# Patient Record
Sex: FEMALE | Race: WHITE | HISPANIC OR LATINO | URBAN - NONMETROPOLITAN AREA
[De-identification: names, ages, dates, MRNs, and addresses within clinical notes are randomized per-mention and may not be internally consistent; named-entity substitution may affect disease eponyms.]

---

## 2022-01-24 ENCOUNTER — OFFICE VISIT (OUTPATIENT)
Dept: URBAN - NONMETROPOLITAN AREA CLINIC 6 | Facility: CLINIC | Age: 62
End: 2022-01-24
Payer: COMMERCIAL

## 2022-01-24 DIAGNOSIS — H25.13 AGE-RELATED NUCLEAR CATARACT, BILATERAL: ICD-10-CM

## 2022-01-24 DIAGNOSIS — H04.123 TEAR FILM INSUFFICIENCY OF BILATERAL LACRIMAL GLANDS: ICD-10-CM

## 2022-01-24 DIAGNOSIS — H43.393 OTHER VITREOUS OPACITIES, BILATERAL: Primary | ICD-10-CM

## 2022-01-24 PROCEDURE — 99204 OFFICE O/P NEW MOD 45 MIN: CPT | Performed by: STUDENT IN AN ORGANIZED HEALTH CARE EDUCATION/TRAINING PROGRAM

## 2022-01-24 ASSESSMENT — INTRAOCULAR PRESSURE
OS: 19
OD: 18

## 2022-01-24 ASSESSMENT — KERATOMETRY
OD: 46.34
OS: 46.30

## 2022-01-24 NOTE — IMPRESSION/PLAN
Impression: Tear film insufficiency of bilateral lacrimal glands: H04.123. Plan: Patient educated on diagnosis. Recommend preservative-free artificial tears (Refresh Optive) 6x/day OU and warm compresses bid for 10 minutes. Additionally recommend lubricating ointment at bedtime in both eyes (Lacrilube, Refresh PM, Genteal). Additionally recommend warm compresses (with clean washcloth or clean sock filled with uncooked rice in microwave for ~45 seconds over eyes for about 10 minutes). DWP options of Restasis, Xiidra, punctal plugs, or soft steroid as second line treatment options.

## 2022-01-24 NOTE — IMPRESSION/PLAN
Impression: Other vitreous opacities, bilateral: H43.393. Plan: Patient educated on vitreous floaters. There is no evidence of tear, break, or retinal detachment on DFE. Patient educated on the S/S of retinal detachment and to RTC immediately if noted.